# Patient Record
Sex: MALE | Race: WHITE | URBAN - METROPOLITAN AREA
[De-identification: names, ages, dates, MRNs, and addresses within clinical notes are randomized per-mention and may not be internally consistent; named-entity substitution may affect disease eponyms.]

---

## 2017-05-31 ENCOUNTER — HOSPITAL ENCOUNTER (OUTPATIENT)
Facility: CLINIC | Age: 52
Setting detail: OBSERVATION
Discharge: HOME | End: 2017-05-31
Attending: EMERGENCY MEDICINE | Admitting: HOSPITALIST
Payer: COMMERCIAL

## 2017-05-31 ENCOUNTER — APPOINTMENT (OUTPATIENT)
Dept: CT IMAGING | Facility: CLINIC | Age: 52
End: 2017-05-31
Attending: EMERGENCY MEDICINE
Payer: COMMERCIAL

## 2017-05-31 VITALS
RESPIRATION RATE: 16 BRPM | HEIGHT: 68 IN | OXYGEN SATURATION: 99 % | WEIGHT: 152 LBS | TEMPERATURE: 98.3 F | DIASTOLIC BLOOD PRESSURE: 69 MMHG | HEART RATE: 94 BPM | SYSTOLIC BLOOD PRESSURE: 110 MMHG | BODY MASS INDEX: 23.04 KG/M2

## 2017-05-31 DIAGNOSIS — N23 RENAL COLIC: ICD-10-CM

## 2017-05-31 DIAGNOSIS — N23 RENAL COLIC ON RIGHT SIDE: Primary | ICD-10-CM

## 2017-05-31 LAB
ALBUMIN UR-MCNC: 30 MG/DL
ANION GAP SERPL CALCULATED.3IONS-SCNC: 6 MMOL/L (ref 3–14)
APPEARANCE UR: CLEAR
BASOPHILS # BLD AUTO: 0 10E9/L (ref 0–0.2)
BASOPHILS NFR BLD AUTO: 0.5 %
BILIRUB UR QL STRIP: NEGATIVE
BUN SERPL-MCNC: 19 MG/DL (ref 7–30)
CALCIUM SERPL-MCNC: 7.9 MG/DL (ref 8.5–10.1)
CHLORIDE SERPL-SCNC: 107 MMOL/L (ref 94–109)
CO2 SERPL-SCNC: 29 MMOL/L (ref 20–32)
COLOR UR AUTO: YELLOW
CREAT SERPL-MCNC: 1 MG/DL (ref 0.66–1.25)
DIFFERENTIAL METHOD BLD: ABNORMAL
EOSINOPHIL # BLD AUTO: 0.1 10E9/L (ref 0–0.7)
EOSINOPHIL NFR BLD AUTO: 1.7 %
ERYTHROCYTE [DISTWIDTH] IN BLOOD BY AUTOMATED COUNT: 12.4 % (ref 10–15)
GFR SERPL CREATININE-BSD FRML MDRD: 78 ML/MIN/1.7M2
GLUCOSE SERPL-MCNC: 99 MG/DL (ref 70–99)
GLUCOSE UR STRIP-MCNC: NEGATIVE MG/DL
HCT VFR BLD AUTO: 39.7 % (ref 40–53)
HGB BLD-MCNC: 12.7 G/DL (ref 13.3–17.7)
HGB UR QL STRIP: ABNORMAL
IMM GRANULOCYTES # BLD: 0 10E9/L (ref 0–0.4)
IMM GRANULOCYTES NFR BLD: 0.2 %
KETONES UR STRIP-MCNC: 20 MG/DL
LEUKOCYTE ESTERASE UR QL STRIP: NEGATIVE
LYMPHOCYTES # BLD AUTO: 0.9 10E9/L (ref 0.8–5.3)
LYMPHOCYTES NFR BLD AUTO: 21.3 %
MCH RBC QN AUTO: 30.2 PG (ref 26.5–33)
MCHC RBC AUTO-ENTMCNC: 32 G/DL (ref 31.5–36.5)
MCV RBC AUTO: 94 FL (ref 78–100)
MONOCYTES # BLD AUTO: 0.3 10E9/L (ref 0–1.3)
MONOCYTES NFR BLD AUTO: 8.2 %
MUCOUS THREADS #/AREA URNS LPF: PRESENT /LPF
NEUTROPHILS # BLD AUTO: 2.8 10E9/L (ref 1.6–8.3)
NEUTROPHILS NFR BLD AUTO: 68.1 %
NITRATE UR QL: NEGATIVE
NRBC # BLD AUTO: 0 10*3/UL
NRBC BLD AUTO-RTO: 0 /100
PH UR STRIP: 6 PH (ref 5–7)
PLATELET # BLD AUTO: 161 10E9/L (ref 150–450)
POTASSIUM SERPL-SCNC: 3.5 MMOL/L (ref 3.4–5.3)
RBC # BLD AUTO: 4.21 10E12/L (ref 4.4–5.9)
RBC #/AREA URNS AUTO: 18 /HPF (ref 0–2)
SODIUM SERPL-SCNC: 142 MMOL/L (ref 133–144)
SP GR UR STRIP: 1.02 (ref 1–1.03)
URN SPEC COLLECT METH UR: ABNORMAL
UROBILINOGEN UR STRIP-MCNC: 0 MG/DL (ref 0–2)
WBC # BLD AUTO: 4.1 10E9/L (ref 4–11)
WBC #/AREA URNS AUTO: 3 /HPF (ref 0–2)

## 2017-05-31 PROCEDURE — 25000132 ZZH RX MED GY IP 250 OP 250 PS 637: Performed by: PHYSICIAN ASSISTANT

## 2017-05-31 PROCEDURE — 96375 TX/PRO/DX INJ NEW DRUG ADDON: CPT

## 2017-05-31 PROCEDURE — 74176 CT ABD & PELVIS W/O CONTRAST: CPT

## 2017-05-31 PROCEDURE — 25000128 H RX IP 250 OP 636: Performed by: EMERGENCY MEDICINE

## 2017-05-31 PROCEDURE — 99285 EMERGENCY DEPT VISIT HI MDM: CPT | Mod: 25

## 2017-05-31 PROCEDURE — 96376 TX/PRO/DX INJ SAME DRUG ADON: CPT

## 2017-05-31 PROCEDURE — 81001 URINALYSIS AUTO W/SCOPE: CPT | Performed by: EMERGENCY MEDICINE

## 2017-05-31 PROCEDURE — 36415 COLL VENOUS BLD VENIPUNCTURE: CPT | Performed by: EMERGENCY MEDICINE

## 2017-05-31 PROCEDURE — 25000128 H RX IP 250 OP 636: Performed by: PHYSICIAN ASSISTANT

## 2017-05-31 PROCEDURE — 80048 BASIC METABOLIC PNL TOTAL CA: CPT | Performed by: EMERGENCY MEDICINE

## 2017-05-31 PROCEDURE — 96361 HYDRATE IV INFUSION ADD-ON: CPT

## 2017-05-31 PROCEDURE — 96374 THER/PROPH/DIAG INJ IV PUSH: CPT

## 2017-05-31 PROCEDURE — 99219 ZZC INITIAL OBSERVATION CARE,LEVL II: CPT | Performed by: PHYSICIAN ASSISTANT

## 2017-05-31 PROCEDURE — 87086 URINE CULTURE/COLONY COUNT: CPT | Performed by: PHYSICIAN ASSISTANT

## 2017-05-31 PROCEDURE — 85025 COMPLETE CBC W/AUTO DIFF WBC: CPT | Performed by: EMERGENCY MEDICINE

## 2017-05-31 PROCEDURE — G0378 HOSPITAL OBSERVATION PER HR: HCPCS

## 2017-05-31 RX ORDER — OXYCODONE HYDROCHLORIDE 5 MG/1
5-10 TABLET ORAL
Status: DISCONTINUED | OUTPATIENT
Start: 2017-05-31 | End: 2017-05-31 | Stop reason: HOSPADM

## 2017-05-31 RX ORDER — OXYCODONE HYDROCHLORIDE 5 MG/1
5 TABLET ORAL EVERY 4 HOURS PRN
Qty: 5 TABLET | Refills: 0 | Status: SHIPPED | OUTPATIENT
Start: 2017-05-31

## 2017-05-31 RX ORDER — SODIUM CHLORIDE 9 MG/ML
INJECTION, SOLUTION INTRAVENOUS CONTINUOUS
Status: DISCONTINUED | OUTPATIENT
Start: 2017-05-31 | End: 2017-05-31 | Stop reason: HOSPADM

## 2017-05-31 RX ORDER — HYDROMORPHONE HYDROCHLORIDE 1 MG/ML
0.5 INJECTION, SOLUTION INTRAMUSCULAR; INTRAVENOUS; SUBCUTANEOUS ONCE
Status: DISCONTINUED | OUTPATIENT
Start: 2017-05-31 | End: 2017-05-31 | Stop reason: HOSPADM

## 2017-05-31 RX ORDER — IBUPROFEN 600 MG/1
600 TABLET, FILM COATED ORAL EVERY 6 HOURS PRN
Status: DISCONTINUED | OUTPATIENT
Start: 2017-05-31 | End: 2017-05-31 | Stop reason: HOSPADM

## 2017-05-31 RX ORDER — ONDANSETRON 2 MG/ML
4 INJECTION INTRAMUSCULAR; INTRAVENOUS ONCE
Status: COMPLETED | OUTPATIENT
Start: 2017-05-31 | End: 2017-05-31

## 2017-05-31 RX ORDER — NALOXONE HYDROCHLORIDE 0.4 MG/ML
.1-.4 INJECTION, SOLUTION INTRAMUSCULAR; INTRAVENOUS; SUBCUTANEOUS
Status: DISCONTINUED | OUTPATIENT
Start: 2017-05-31 | End: 2017-05-31 | Stop reason: HOSPADM

## 2017-05-31 RX ORDER — TAMSULOSIN HYDROCHLORIDE 0.4 MG/1
0.4 CAPSULE ORAL DAILY
Status: DISCONTINUED | OUTPATIENT
Start: 2017-05-31 | End: 2017-05-31 | Stop reason: HOSPADM

## 2017-05-31 RX ORDER — LIDOCAINE 40 MG/G
CREAM TOPICAL
Status: DISCONTINUED | OUTPATIENT
Start: 2017-05-31 | End: 2017-05-31 | Stop reason: HOSPADM

## 2017-05-31 RX ORDER — HYDROMORPHONE HYDROCHLORIDE 1 MG/ML
0.5 INJECTION, SOLUTION INTRAMUSCULAR; INTRAVENOUS; SUBCUTANEOUS ONCE
Status: COMPLETED | OUTPATIENT
Start: 2017-05-31 | End: 2017-05-31

## 2017-05-31 RX ORDER — ACETAMINOPHEN 325 MG/1
650 TABLET ORAL EVERY 4 HOURS PRN
Status: DISCONTINUED | OUTPATIENT
Start: 2017-05-31 | End: 2017-05-31 | Stop reason: HOSPADM

## 2017-05-31 RX ORDER — ONDANSETRON 2 MG/ML
4 INJECTION INTRAMUSCULAR; INTRAVENOUS EVERY 6 HOURS PRN
Status: DISCONTINUED | OUTPATIENT
Start: 2017-05-31 | End: 2017-05-31 | Stop reason: HOSPADM

## 2017-05-31 RX ORDER — HYDROMORPHONE HYDROCHLORIDE 1 MG/ML
.3-.5 INJECTION, SOLUTION INTRAMUSCULAR; INTRAVENOUS; SUBCUTANEOUS
Status: DISCONTINUED | OUTPATIENT
Start: 2017-05-31 | End: 2017-05-31 | Stop reason: HOSPADM

## 2017-05-31 RX ORDER — EPINEPHRINE 0.3 MG/.3ML
0.3 INJECTION SUBCUTANEOUS PRN
COMMUNITY

## 2017-05-31 RX ORDER — HYDROMORPHONE HYDROCHLORIDE 1 MG/ML
0.5 INJECTION, SOLUTION INTRAMUSCULAR; INTRAVENOUS; SUBCUTANEOUS
Status: DISCONTINUED | OUTPATIENT
Start: 2017-05-31 | End: 2017-05-31 | Stop reason: HOSPADM

## 2017-05-31 RX ORDER — ONDANSETRON 4 MG/1
4 TABLET, ORALLY DISINTEGRATING ORAL EVERY 6 HOURS PRN
Status: DISCONTINUED | OUTPATIENT
Start: 2017-05-31 | End: 2017-05-31 | Stop reason: HOSPADM

## 2017-05-31 RX ADMIN — SODIUM CHLORIDE 1000 ML: 9 INJECTION, SOLUTION INTRAVENOUS at 09:17

## 2017-05-31 RX ADMIN — SODIUM CHLORIDE 1000 ML: 9 INJECTION, SOLUTION INTRAVENOUS at 12:27

## 2017-05-31 RX ADMIN — ONDANSETRON 4 MG: 2 INJECTION INTRAMUSCULAR; INTRAVENOUS at 10:16

## 2017-05-31 RX ADMIN — ONDANSETRON 4 MG: 2 INJECTION INTRAMUSCULAR; INTRAVENOUS at 09:19

## 2017-05-31 RX ADMIN — SODIUM CHLORIDE: 9 INJECTION, SOLUTION INTRAVENOUS at 16:37

## 2017-05-31 RX ADMIN — TAMSULOSIN HYDROCHLORIDE 0.4 MG: 0.4 CAPSULE ORAL at 16:38

## 2017-05-31 RX ADMIN — ONDANSETRON 4 MG: 2 INJECTION INTRAMUSCULAR; INTRAVENOUS at 12:56

## 2017-05-31 RX ADMIN — Medication 0.5 MG: at 09:19

## 2017-05-31 RX ADMIN — Medication 0.5 MG: at 10:17

## 2017-05-31 RX ADMIN — Medication 0.5 MG: at 12:32

## 2017-05-31 ASSESSMENT — ENCOUNTER SYMPTOMS
VOMITING: 0
NAUSEA: 1
FLANK PAIN: 1
DYSURIA: 0
ABDOMINAL PAIN: 1
HEMATURIA: 0
DIARRHEA: 0
FREQUENCY: 0

## 2017-05-31 NOTE — ED NOTES
Right flank pain since this am.  History of kidney stones.  Patient alert and oriented x3.  Airway, breathing and circulation intact.

## 2017-05-31 NOTE — IP AVS SNAPSHOT
MRN:[id number]                      After Visit Summary   5/31/2017    Jose Larsen    MRN: [id number]           Thank you!     Thank you for choosing [facility] for your care. Our goal is always to provide you with excellent care. Hearing back from our patients is one way we can continue to improve our services. Please take a few minutes to complete the written survey that you may receive in the mail after you visit. If you would like to speak to someone directly about your visit please contact Patient Relations at [phone]. Thank you!          Patient Information     Date Of Birth          1965        About your hospital stay     You were admitted on:  May 31, 2017 You last received care in the:  [facility] Observation Department    You were discharged on:  May 31, 2017        Reason for your hospital stay       You were admitted due to concerns of right low back pain secondary to a kidney stone found on CT scan. Case was discussed with Urology and a trial of conservative management was done with resolution of your pain and possible passing of the stone this evening. You are safe to discharge home with outpatient follow up.                  Who to Call     For medical emergencies, please call 911.  For non-urgent questions about your medical care, please call your primary care provider or clinic, [phone]          Attending Provider     Provider Specialty    Verenice Garrison MD Emergency Medicine    Feura Bush, Andres [place] MD Internal Medicine       Primary Care Provider Office Phone # Fax #    Mago JADE Stewart [phone]       When to contact your care team       Call your primary doctor or contact Urology if you have any of the following: increased pain.                  After Care Instructions     Activity       Your activity upon discharge: activity as tolerated            Diet       Follow this diet upon discharge: Orders Placed This  "Encounter      Regular Diet Adult                  Follow-up Appointments     Follow-up and recommended labs and tests        Follow up with primary care provider, Mago Stewart, within 7 days for hospital follow- up.  No follow up labs or test are needed.  Follow up Urology as needed if pain returns.                             Further instructions from your care team       Follow up with urologic physicians if pain returns     General number [phone]  [place] office [phone]     Pending Results     Date and Time Order Name Status Description    2017 1516 Urine Culture Aerobic Bacterial Preliminary             Statement of Approval     Ordered          17 1950  I have reviewed and agree with all the recommendations and orders detailed in this document.  EFFECTIVE NOW     Approved and electronically signed by:  Elba Pena PA-C             Admission Information     Date & Time Provider Department Dept. Phone    2017 Andres Fuentes MD [facility] Observation Department [phone]      Your Vitals Were     Blood Pressure Pulse Temperature Respirations Height Weight    110/69 94 98.3  F (36.8  C) (Oral) 16 1.727 m (5' 8\") 68.9 kg (152 lb)    Pulse Oximetry BMI (Body Mass Index)                99% 23.11 kg/m2          MyChart Information     [employer] lets you send messages to your doctor, view your test results, renew your prescriptions, schedule appointments and more. To sign up, go to [email or web address] . Click on \"Log in\" on the left side of the screen, which will take you to the Welcome page. Then click on \"Sign up Now\" on the right side of the page.     You will be asked to enter the access code listed below, as well as some personal information. Please follow the directions to create your username and password.     Your access code is: [email or web address]  Expires: 2017  8:07 PM     Your access code will  in 90 days. If you need help or a new code, " please call your [place] clinic or [phone].        Care EveryWhere ID     This is your Care EveryWhere ID. This could be used by other organizations to access your [facility] medical records  [id number]           Review of your medicines      CONTINUE these medicines which may have CHANGED, or have new prescriptions. If we are uncertain of the size of tablets/capsules you have at home, strength may be listed as something that might have changed.        Dose / Directions    * oxyCODONE 5 MG IR tablet   Commonly known as:  ROXICODONE   This may have changed:  Another medication with the same name was added. Make sure you understand how and when to take each.   Used for:  Nephrolithiasis, Ureterolithiasis        Dose:  5-10 mg   Take 1-2 tablets (5-10 mg) by mouth every 4 hours as needed for moderate to severe pain   Quantity:  20 tablet   Refills:  0       * oxyCODONE 5 MG IR tablet   Commonly known as:  ROXICODONE   This may have changed:  You were already taking a medication with the same name, and this prescription was added. Make sure you understand how and when to take each.        Dose:  5 mg   Take 1 tablet (5 mg) by mouth every 4 hours as needed for moderate to severe pain   Quantity:  5 tablet   Refills:  0       * Notice:  This list has 2 medication(s) that are the same as other medications prescribed for you. Read the directions carefully, and ask your doctor or other care provider to review them with you.      CONTINUE these medicines which have NOT CHANGED        Dose / Directions    ACETAMINOPHEN PO        Dose:  1000 mg   Take 1,000 mg by mouth as needed for pain   Refills:  0       EPINEPHrine 0.3 MG/0.3ML injection        Dose:  0.3 mg   Inject 0.3 mg into the muscle as needed for anaphylaxis   Refills:  0       ondansetron 4 MG ODT tab   Commonly known as:  ZOFRAN-ODT   Used for:  Nephrolithiasis        Dose:  4 mg   Take 1 tablet (4 mg) by mouth every 6 hours as needed for nausea   Quantity:   20 tablet   Refills:  0       PROBIOTIC DAILY PO        Dose:  1 each   Take 1 each by mouth daily   Refills:  0       tamsulosin 0.4 MG capsule   Commonly known as:  FLOMAX   Used for:  Ureterolithiasis, Nephrolithiasis        Dose:  0.4 mg   Take 1 capsule (0.4 mg) by mouth daily   Quantity:  5 capsule   Refills:  1       ZYRTEC ALLERGY PO        Dose:  10 mg   Take 10 mg by mouth daily   Refills:  0            Where to get your medicines      Some of these will need a paper prescription and others can be bought over the counter. Ask your nurse if you have questions.     Bring a paper prescription for each of these medications     oxyCODONE 5 MG IR tablet                Protect others around you: Learn how to safely use, store and throw away your medicines at www.disposemymeds.org.             Medication List: This is a list of all your medications and when to take them. Check marks below indicate your daily home schedule. Keep this list as a reference.      Medications           Morning Afternoon Evening Bedtime As Needed    ACETAMINOPHEN PO   Take 1,000 mg by mouth as needed for pain                                EPINEPHrine 0.3 MG/0.3ML injection   Inject 0.3 mg into the muscle as needed for anaphylaxis                                ondansetron 4 MG ODT tab   Commonly known as:  ZOFRAN-ODT   Take 1 tablet (4 mg) by mouth every 6 hours as needed for nausea                                * oxyCODONE 5 MG IR tablet   Commonly known as:  ROXICODONE   Take 1-2 tablets (5-10 mg) by mouth every 4 hours as needed for moderate to severe pain                                * oxyCODONE 5 MG IR tablet   Commonly known as:  ROXICODONE   Take 1 tablet (5 mg) by mouth every 4 hours as needed for moderate to severe pain                                PROBIOTIC DAILY PO   Take 1 each by mouth daily                                tamsulosin 0.4 MG capsule   Commonly known as:  FLOMAX   Take 1 capsule (0.4 mg) by mouth daily    Last time this was given:  0.4 mg on 5/31/2017  4:38 PM                                ZYRTEC ALLERGY PO   Take 10 mg by mouth daily                                * Notice:  This list has 2 medication(s) that are the same as other medications prescribed for you. Read the directions carefully, and ask your doctor or other care provider to review them with you.

## 2017-05-31 NOTE — ED PROVIDER NOTES
"  History     Chief Complaint:  Flank Pain    HPI   Jose Larsen is a 52 year old male with a history of kidney stones who presents to the emergency department today for evaluation of right flank pain since this morning. He states he has had a kidney stone for the past year and only recently it has flared up. He started having some abdominal pain this morning that radiates into his back and right testicle. When he stands up he has some nausea. He took an oxycodone at 0730 and at 0830 for pain. He denies any fever, chills, hematuria, dysuria, frequency changes, vomiting, or diarrhea. He notes that these symptoms are typical of his kidney stones in the past.    Allergies:  No Known Drug Allergies      Medications:    Flonase  Roxicodone  Flomax  Zyrtec  Zofran  Tylenol     Past Medical History:    Nephrolithiasis  Kidney Stones     Past Surgical History:    Herniorrhaphy inguinal   Left toe surgery  Tonsillectomy     Family History:    History reviewed. No pertinent family history.      Social History:  Smoking Status: never smoker  Alcohol Use: negative    Marital Status:   [2]    Review of Systems   Gastrointestinal: Positive for abdominal pain and nausea. Negative for diarrhea and vomiting.   Genitourinary: Positive for flank pain. Negative for dysuria, frequency and hematuria.   All other systems reviewed and are negative.    Physical Exam   Vitals:  Patient Vitals for the past 24 hrs:   BP Temp Temp src Pulse Heart Rate Resp SpO2 Height Weight   05/31/17 1245 116/72 - - - - - 95 % - -   05/31/17 1230 122/80 - - - - - 99 % - -   05/31/17 1218 - - - - - - 98 % - -   05/31/17 1026 - - - - - - 95 % - -   05/31/17 1025 - - - - - - 97 % - -   05/31/17 1022 - - - - - - 94 % - -   05/31/17 0840 121/80 97.2  F (36.2  C) Temporal 94 94 20 99 % 1.727 m (5' 8\") 68.9 kg (152 lb)        Physical Exam  Constitutional: The patient is oriented to person, place, and time. Alert and cooperative.  HENT:   Right Ear: " External ear normal.   Left Ear: External ear normal.   Nose: Nose normal.   Mouth/Throat: Uvula is midline, oropharynx is clear and moist and mucous membranes are normal. No posterior oropharyngeal edema or erythema.   Eyes: Conjunctivae, EOM and lids are normal. Pupils are equal, round, and reactive to light.   Neck: Trachea normal. Normal range of motion. Neck supple.   Cardiovascular: Normal rate, regular rhythm, normal heart sounds, and intact distal pulses.    Pulmonary/Chest: Effort normal and breath sounds equal bilaterally. No crackles or wheezing.   Abdominal: Soft. Mild tenderness to palpation in the R abdomen. No rebound and no guarding. Mild R CVA tenderness.   Musculoskeletal: Normal range of motion.  No extremity tenderness or edema.  Neurological: Alert and Oriented. Strength 5/5 in upper and lower extremities bilaterally. Sensation intact to light touch throughout.  Skin: Skin is dry. No rash noted.          Emergency Department Course     Imaging:  Radiology findings were communicated with the patient who voiced understanding of the findings.    Abd/pelvis CT no contrast - Stone Protocol   IMPRESSION:    1. Obstructing 0.5 cm stone at the right ureterovesical junction   causes mild right hydronephrosis.   2. There are single tiny nonobstructing stones also noted in both   kidneys.     Laboratory:  Laboratory findings were communicated with the patient who voiced understanding of the findings.    BMP: AWNL (Creatinine 1.00)  CBC: WBC 4.1, HGB 12.7 (H),   UA: Ketones: 20 (A), Blood: Small (A), Protein Albumin: 30 (A), WBC: 3 (H), RBC: 18 (H), Mucous: Present (A)     Interventions:  0917 NS 1000 ml IV   0919 Dilaudid 0.5 mg IV   0919 Zofran 4 mg IV   1016 Zofran 4 mg IV  1227 NS 1000 ml IV   1232 Dilaudid 0.5 mg IV   1256 Zofran 4 mg IV     Emergency Department Course:  Nursing notes and vitals reviewed.  I performed an exam of the patient as documented above.   IV was inserted and blood was  drawn for laboratory testing, results above.  The patient provided a urine sample here in the emergency department. This was sent for laboratory testing, findings above.   The patient was sent for imaging per above while in the emergency department, results above.     At 1242 the patient was rechecked on and was updated on the results of his studies.  1:11 PM: I spoke with Judith Cedillo PA-C of the hospitalist service regarding patient's presentation, findings, and plan of care.    I discussed the treatment plan with the patient. They expressed understanding of this plan and consented to admission. I discussed the patient with Judith Cedillo PA-C, who will admit the patient to a monitored bed for further evaluation and treatment.   I personally reviewed the laboratory and imaging results with the patient and answered all related questions prior to admission.    Impression & Plan      Medical Decision Making:  Jose Larsen is a 52 year old male who presents with unilateral right flank and abdominal pain consistent with renal colic. A broad differential diagnosis was considered including diverticulitis, ureterolithiasis, tumor, colitis, cholecystitis, aneurysm, dissection, volvulus, appendicitis, splenic issue, stomach pathology, ulcer, hydronephrosis, pneumonia, rib fracture, UTI, pyelonephritis, amongst many other etiologies. CT confirms a ureteral stone and no other acute pathology identified. We have been unable to control the patient's symptoms in the ED, therefore they will be admitted to observation for further pain/nausea/vomiting control. There is no fever or evidence of a concomitant urinary tract infection or renal dysfunction. The patient was discussed with the hospitalist and they agreed to accept the patient on to their service. He was stable/improved at the time of admission.     Diagnosis:    ICD-10-CM    1. Renal colic N23      Disposition:   Admission     Scribe Disclosure:  Kashif TRIVEDI, am  serving as a scribe at 9:08 AM on 5/31/2017 to document services personally performed by Verenice Garrison MD, based on my observations and the provider's statements to me.   5/31/2017   [facility] EMERGENCY DEPARTMENT       Verenice Garrison MD  06/01/17 1059

## 2017-05-31 NOTE — PROGRESS NOTES
ROOM # 206-2    Living Situation (if not independent, order SW consult): Home  Facility name: Home   : John (wife) [phone]    Activity level at baseline: Ind  Activity level on admit: SBA    Patient registered to observation; given Patient Bill of Rights; given the opportunity to ask questions about observation status and their plan of care.  Patient has been oriented to the observation room, bathroom and call light is in place.    Discussed discharge goals and expectations with patient/family.

## 2017-05-31 NOTE — ED NOTES
[facility]  ED Nurse Handoff Report    Jose Larsen is a 52 year old male   ED Chief complaint: Flank Pain  . ED Diagnosis:   Final diagnoses:   Renal colic     Allergies: No Known Allergies    Code Status: Full Code  Activity level - Baseline/Home:  Independent. Activity Level - Current:   Independent. Lift room needed: No. Bariatric: No   Needed: No   Isolation: No. Infection: Not Applicable.     Vital Signs:   Vitals:    05/31/17 1026 05/31/17 1218 05/31/17 1230 05/31/17 1245   BP:   122/80 116/72   Pulse:       Resp:       Temp:       TempSrc:       SpO2: 95% 98% 99% 95%   Weight:       Height:         Cardiac Rhythm:  ,      Pain level: 0-10 Pain Scale: 8  Patient confused: No. Patient Falls Risk: No.   Elimination Status: Has voided   Patient Report - Initial Complaint: right flank and groin pain. Focused Assessment: History of kidney stones, pain started this morning, pain in right flank and right groin, able to void small amounts, no dysuria, ABCs intact, alert and oriented   Tests Performed: CT, urine, labs. Abnormal Results: Obstructing stone in ureter per CT, no UTI, other labs WNL.   Treatments provided: pain meds, nausea meds, fluids, needs IP urology consult  Family Comments: Wife not present  OBS brochure/video discussed/provided to patient:  Yes  ED Medications:   Medications   HYDROmorphone (PF) (DILAUDID) injection 0.5 mg (0.5 mg Intravenous Given 5/31/17 1232)   HYDROmorphone (PF) (DILAUDID) injection 0.5 mg (not administered)   0.9% sodium chloride BOLUS (0 mLs Intravenous Stopped 5/31/17 1011)   HYDROmorphone (PF) (DILAUDID) injection 0.5 mg (0.5 mg Intravenous Given 5/31/17 0919)   ondansetron (ZOFRAN) injection 4 mg (4 mg Intravenous Given 5/31/17 0919)   ondansetron (ZOFRAN) injection 4 mg (4 mg Intravenous Given 5/31/17 1016)   0.9% sodium chloride BOLUS (1,000 mLs Intravenous New Bag 5/31/17 1227)   ondansetron (ZOFRAN) injection 4 mg (4 mg Intravenous Given  5/31/17 1256)     Drips infusing:  No     ED Nurse Name/Phone Number: Caryl Barajas,   2:09 PM    RECEIVING UNIT ED HANDOFF REVIEW    Above ED Nurse Handoff Report was reviewed: Yes  Reviewed by: Lubna Rea on May 31, 2017 at 2:38 PM

## 2017-05-31 NOTE — PHARMACY-ADMISSION MEDICATION HISTORY
Admission medication history interview status for this patient is complete. See [facility] admission navigator for allergy information, prior to admission medications and immunization status.     Medication history interview source(s):Patient  Medication history resources (including written lists, pill bottles, clinic record):None  Primary pharmacy: [facility] in [place]    Changes made to [facility] medication list:  Added: Epipen and probiotic  Deleted: fluticasone  Changed: Acetaminophen to 1000 mg as needed for pain    Actions taken by pharmacist (provider contacted, etc):None     Additional medication history information:None    Medication reconciliation/reorder completed by provider prior to medication history? No      Prior to Admission medications    Medication Sig Last Dose Taking? Auth Provider   EPINEPHrine 0.3 MG/0.3ML injection Inject 0.3 mg into the muscle as needed for anaphylaxis  Yes Unknown, Entered By History   Probiotic Product (PROBIOTIC DAILY PO) Take 1 each by mouth daily 5/30/2017 at Unknown time Yes Unknown, Entered By History   ACETAMINOPHEN PO Take 1,000 mg by mouth as needed for pain  Yes Unknown, Entered By History   oxyCODONE (ROXICODONE) 5 MG immediate release tablet Take 1-2 tablets (5-10 mg) by mouth every 4 hours as needed for moderate to severe pain 5/31/2017 at am Yes Verenice Umanzor PA-C   tamsulosin (FLOMAX) 0.4 MG 24 hr capsule Take 1 capsule (0.4 mg) by mouth daily 5/30/2017 at pm Yes Verenice Umanzor PA-C   Cetirizine HCl (ZYRTEC ALLERGY PO) Take 10 mg by mouth daily  5/30/2017 at Unknown time Yes Reported, Patient   ondansetron (ZOFRAN-ODT) 4 MG disintegrating tablet Take 1 tablet (4 mg) by mouth every 6 hours as needed for nausea   Verenice Umanzor PA-C Amanda Akogyeram-Salami, PharmD

## 2017-06-01 LAB
BACTERIA SPEC CULT: NO GROWTH
Lab: NORMAL
MICRO REPORT STATUS: NORMAL
SPECIMEN SOURCE: NORMAL

## 2017-06-01 NOTE — DISCHARGE INSTRUCTIONS
Follow up with urologic physicians if pain returns     General number [phone]  [place] office [phone]

## 2017-06-01 NOTE — DISCHARGE SUMMARY
Patient was admitted earlier today for acute renal colic with obstructing stone and right hydronephrosis, please refer to initial H&P. Suspect patient passed the stone on his own or it is now located in his bladder since he reported to recurrence of his pain since arriving to the observation unit. The patient will continue supportive care at home along with straining his urine. He is instructed to contact Urology as an outpatient as needed if he has recurrence of his pain otherwise will plan for hospital f/u with his PCP within the next week.     Elba Pena PA-C

## 2017-06-01 NOTE — PLAN OF CARE
Problem: Discharge Planning  Goal: Discharge Planning (Adult, OB, Behavioral, Peds)  Outcome: Adequate for Discharge Date Met:  05/31/17  Patient's After Visit Summary was reviewed with patient and/or self .   Patient verbalized understanding of After Visit Summary, recommended follow up and was given an opportunity to ask questions.   Discharge medications sent home with patient/family: Paper prescription for oxycodone.    Discharged with other:self.           OBSERVATION patient END time: 2020

## 2020-01-06 NOTE — H&P
"[facility]  Observation Unit  H & P      Patient Name: Jose Larsen MRN# [id number]   Age: 52 year old YOB: 1965     Date of Admission:5/31/2017    Primary care provider: Mago Stewart  Date of Service: 5/31/2017         Assessment and Plan:   Jose Larsen is a 52 year old male with a history of HLD, Allergic Rhinitis and Nephrolithiasis who presented to the ED today 2/2 Right Flank Pain.       Acute Renal Colic with Obstructing Right Stone and Right Hydronephrosis - ED work up revealed patient hemodynamically stable and afebrile on room air.  Laboratory work up revealed Hgb 12.7 with otherwise normal BMP and CBC.  UA with ketones, protein, blood, 3wbc and 18 rbc and mucous.  CT Abd/Pelvis revealed an obstructing 0.5cm stone in the right ureterovesical junction with mild right hydronephrosis with bilateral non obstructing stones in both kidneys.  Patient received Dilaudid, NS and Zofran and admitted for further management.  - Urology consulted with plans for conservative management overnight and reassess in am  - IVF hydration  - Flomax  - pain and nausea control  - follow up urine culture    CODE: Full  Diet/IVF: regular, NS  GI ppx:  Tolerating a diet  DVT ppx: ambulation    Judith BARRAZAC  Physician Assistant   Hospitalist Service  Pager: [phone]           Chief Complaint:   Right flank pain         HPI:   52 year old male with a history of HLD, Allergic Rhinitis and Nephrolithiasis who presented to the ED today 2/2 Right Flank Pain.     Patient states he awoke this morning with right low back pain radiating around to the right groin.  Pain is \"crampy, constant, throbbing\" pain which is the same in character to past episodes of kidney stones.  He denies recent muscle strain or overuse.  He reports nausea without emesis or abdominal pain.   He reports difficulty starting his urine stream and voiding small amounts of concentrated urine today.  Denies chest pain, " shortness of breath, cough.           Past Medical History:     Past Medical History:   Diagnosis Date     Allergic rhinitis      Hyperlipidemia      Nephrolithiasis           Past Surgical History:     Past Surgical History:   Procedure Laterality Date     HERNIORRHAPHY INGUINAL  4/24/2012    Procedure:HERNIORRHAPHY INGUINAL; Repair Incarcerated Left Inguinal Hernia with mesh ; Surgeon:HALEY JOSE; Location:[facility] OR     ORTHOPEDIC SURGERY      toe surgery on left foot     TONSILLECTOMY       wisdom teeth            Social History:     Social History     Social History     Marital status:      Spouse name: N/A     Number of children: 4     Years of education: N/A     Occupational History     consultant      Social History Main Topics     Smoking status: Never Smoker     Smokeless tobacco: Never Used     Alcohol use No     Drug use: No     Sexual activity: Not on file     Other Topics Concern     Not on file     Social History Narrative          Family History:     Family History   Problem Relation Age of Onset     Coronary Artery Disease Mother      Coronary Artery Disease Father           Allergies:    No Known Allergies       Medications:     Prior to Admission medications    Medication Sig Last Dose Taking? Auth Provider   fluticasone (FLONASE) 50 MCG/ACT nasal spray Spray 2 sprays into both nostrils daily  Yes Unknown, Entered By History   oxyCODONE (ROXICODONE) 5 MG immediate release tablet Take 1-2 tablets (5-10 mg) by mouth every 4 hours as needed for moderate to severe pain  Yes Verenice Umanzor PA-C   tamsulosin (FLOMAX) 0.4 MG 24 hr capsule Take 1 capsule (0.4 mg) by mouth daily  Yes Verenice Umanzor PA-C   Cetirizine HCl (ZYRTEC ALLERGY PO) Take  by mouth daily.  Yes Reported, Patient   ondansetron (ZOFRAN-ODT) 4 MG disintegrating tablet Take 1 tablet (4 mg) by mouth every 6 hours as needed for nausea   Verenice Umanzor PA-C   acetaminophen (TYLENOL) 500 MG tablet Take 2 tablets  "(1,000 mg) by mouth 3 times daily   Verenice Umanzor PA-C          Review of Systems:   A complete ROS was performed and is negative other than what is stated in the HPI.       Physical Exam:   Blood pressure 121/80, pulse 94, temperature 97.2  F (36.2  C), temperature source Temporal, resp. rate 20, height 1.727 m (5' 8\"), weight 68.9 kg (152 lb), SpO2 98 %.  General: Alert, interactive, NAD, lying in bed, pleasant  HEENT: AT/NC, sclera anicteric, PERRL  Chest/Resp: clear to auscultation bilaterally, no crackles or wheezes  Heart/CV: regular rate and rhythm, no murmur  Abdomen/GI: Soft, right flank tenderness, nondistended. +BS.  No rebound or guarding.  Extremities/MSK: No LE edema  Skin: Warm and dry  Neuro: Alert & oriented x 3, no focal deficits, moves all extremities equally         Labs:   ROUTINE ICU LABS (Last four results)  CMP  Recent Labs  Lab 05/31/17  0945      POTASSIUM 3.5   CHLORIDE 107   CO2 29   ANIONGAP 6   GLC 99   BUN 19   CR 1.00   GFRESTIMATED 78   GFRESTBLACK >90African American GFR Calc   HELEN 7.9*     CBC  Recent Labs  Lab 05/31/17  0945   WBC 4.1   RBC 4.21*   HGB 12.7*   HCT 39.7*   MCV 94   MCH 30.2   MCHC 32.0   RDW 12.4        INRNo lab results found in last 7 days.  Arterial Blood GasNo lab results found in last 7 days.       Imaging/Procedures:     Results for orders placed or performed during the hospital encounter of 05/31/17   Abd/pelvis CT no contrast - Stone Protocol    Narrative    CT ABDOMEN AND PELVIS WITHOUT CONTRAST   5/31/2017 9:49 AM     HISTORY: Right flank pain.    TECHNIQUE: Volumetric helical sections were acquired from the lung  bases through the ischial tuberosities without IV contrast. Coronal  images were also reconstructed. Radiation dose for this scan was  reduced using automated exposure control, adjustment of the mA and/or  kV according to patient size, or iterative reconstruction technique.    COMPARISON: CT of the abdomen and pelvis " performed 8/12/2015.    FINDINGS: There is an obstructing 0.5 cm stone at the right  ureterovesical junction, causing mild right hydronephrosis.  Nonobstructing 0.2 cm stone in the lower pole of the right kidney.  Nonobstructing 0.2 cm stone in the interpolar region of the left  kidney. No ureteral calculi or hydronephrosis on the left.    The liver, spleen, gallbladder, adrenal glands, and pancreas have  unremarkable noncontrast appearances. No bowel obstruction.  Unremarkable appendix. No free fluid in the pelvis. The visualized  lung bases are clear.      Impression    IMPRESSION:   1. Obstructing 0.5 cm stone at the right ureterovesical junction  causes mild right hydronephrosis.  2. There are single tiny nonobstructing stones also noted in both  kidneys.            none